# Patient Record
(demographics unavailable — no encounter records)

---

## 2024-11-25 NOTE — HISTORY OF PRESENT ILLNESS
[FreeTextEntry1] : Lowell is a pleasant 50 yo male with no prior medical illness. He presents for evaluation of elevated BP and atypical chest pain. He states at times of increased anxiety he experiences left and right sided chest pain. The pain is not exertional and does not limit his activity. He went to an urgent care for his routine health care. He was referred here from there. He rides a bike actively. Notices an atypical pattern to his chest pain. Last episode one month ago and upon me walking into the room. The pain is vague, non-radiating, and not associated with activity. He notices it when stressed or anxious and struggles to describe it. His BP was elevated today and recheck was 152/90 left arm by my reading. He has readings at home of 110-150/70-90. He is a self-described worrier. No palpitations, syncope or near syncope. No CRUZ or SOB.

## 2024-11-25 NOTE — PHYSICAL EXAM
[Well Developed] : well developed [Well Nourished] : well nourished [No Acute Distress] : no acute distress [Normal Conjunctiva] : normal conjunctiva [No Xanthelasma] : no xanthelasma [Normal Venous Pressure] : normal venous pressure [No Carotid Bruit] : no carotid bruit [Normal S1, S2] : normal S1, S2 [No Murmur] : no murmur [No Rub] : no rub [No Gallop] : no gallop [Clear Lung Fields] : clear lung fields [Good Air Entry] : good air entry [No Respiratory Distress] : no respiratory distress  [Soft] : abdomen soft [Non Tender] : non-tender [No Masses/organomegaly] : no masses/organomegaly [Normal Bowel Sounds] : normal bowel sounds [Normal Gait] : normal gait [Gait - Sufficient for Exercise Testing] : gait - sufficient for exercise testing [No Edema] : no edema [No Cyanosis] : no cyanosis [No Clubbing] : no clubbing [No Rash] : no rash [No Skin Lesions] : no skin lesions [Moves all extremities] : moves all extremities [No Focal Deficits] : no focal deficits [Normal Speech] : normal speech [Alert and Oriented] : alert and oriented [Normal memory] : normal memory

## 2024-11-25 NOTE — ASSESSMENT
[FreeTextEntry1] : This patient has elevated BP on numerous readings. I will start him on losartan 50 mg daily. I would like him to have an echo to evaluate for LVH as his BP seems to have been high for some time without treatment.   Labs reviewed. Lipids at goal. Follow up after testing. I have instructed him to maintain a BP log, record, and bring with his cuff to follow up. I advised him he should have a PCP.

## 2024-12-17 NOTE — ASSESSMENT
[FreeTextEntry1] : No changes in his BP treatment.   For some reason his prior echo was not obtained. I will obtain this to evaluate his atypical chest pain and make certain he does not have hypertensive heart disease. He has never had an echo prior and had several years of elevated BP.   I will see him yearly. I did refer him to Dr. Shepard, whom also is from [place]. I think they will be a good fit for each other.

## 2024-12-17 NOTE — HISTORY OF PRESENT ILLNESS
[FreeTextEntry1] : Lowell is a pleasant 50 yo male with no prior medical illness. He presents for evaluation of elevated BP and atypical chest pain. He states at times of increased anxiety he experiences left and right sided chest pain. The pain is not exertional and does not limit his activity. He was placed on losartan. He obtained a bp cuff and presents today with readings and his cuff. It is confirmed to be accurate. No further chest pain. He is doing well with his losartan without side effect. He is noticing significant improved BP readings. None recorded over 140 and lower than 95.

## 2025-04-02 NOTE — HISTORY OF PRESENT ILLNESS
[FreeTextEntry1] : Red bump right cheek for years now getting tender.  [de-identified] : h/o Bcc on face

## 2025-04-02 NOTE — ASSESSMENT
[FreeTextEntry1] : Alert, oriented, well pleasant.   Sun-exposed cutaneous exam. No evidence of cutaneous malignancy. Well healed scars. No evidence of recurrence. No lymphadenopathy.  Brown, yellow papules and plaques generalized.  Seborrheic keratoses.  No treatment.  Actinic damage.  Reviewed sun protection.  Compliant.  Brown macules and papules less than 0.6cm generalized especially trunk.  Nevi. No treatment.  Erythematous papules especially trunk. Angioma. No treatment.   erythematous 7mm nodule right cheek. Mildly tender to pressure. Cyst. Options discussed including warm compresses and intralesional injection. Defers.   Follow up 1 year.